# Patient Record
Sex: MALE | Race: WHITE | Employment: STUDENT | URBAN - METROPOLITAN AREA
[De-identification: names, ages, dates, MRNs, and addresses within clinical notes are randomized per-mention and may not be internally consistent; named-entity substitution may affect disease eponyms.]

---

## 2017-12-21 ENCOUNTER — LAB ENCOUNTER (OUTPATIENT)
Dept: LAB | Age: 22
End: 2017-12-21
Attending: FAMILY MEDICINE
Payer: COMMERCIAL

## 2017-12-21 ENCOUNTER — OFFICE VISIT (OUTPATIENT)
Dept: FAMILY MEDICINE CLINIC | Facility: CLINIC | Age: 22
End: 2017-12-21

## 2017-12-21 VITALS
TEMPERATURE: 98 F | HEIGHT: 72 IN | HEART RATE: 115 BPM | DIASTOLIC BLOOD PRESSURE: 70 MMHG | BODY MASS INDEX: 26.89 KG/M2 | WEIGHT: 198.5 LBS | RESPIRATION RATE: 16 BRPM | SYSTOLIC BLOOD PRESSURE: 130 MMHG

## 2017-12-21 DIAGNOSIS — Z23 NEED FOR VACCINATION: ICD-10-CM

## 2017-12-21 DIAGNOSIS — Z00.00 ADULT GENERAL MEDICAL EXAM: ICD-10-CM

## 2017-12-21 DIAGNOSIS — Z00.00 ADULT GENERAL MEDICAL EXAM: Primary | ICD-10-CM

## 2017-12-21 PROCEDURE — 80061 LIPID PANEL: CPT

## 2017-12-21 PROCEDURE — 36415 COLL VENOUS BLD VENIPUNCTURE: CPT

## 2017-12-21 PROCEDURE — 85025 COMPLETE CBC W/AUTO DIFF WBC: CPT

## 2017-12-21 PROCEDURE — 99385 PREV VISIT NEW AGE 18-39: CPT | Performed by: FAMILY MEDICINE

## 2017-12-21 PROCEDURE — 80053 COMPREHEN METABOLIC PANEL: CPT

## 2017-12-21 PROCEDURE — 90686 IIV4 VACC NO PRSV 0.5 ML IM: CPT | Performed by: FAMILY MEDICINE

## 2017-12-21 PROCEDURE — 90471 IMMUNIZATION ADMIN: CPT | Performed by: FAMILY MEDICINE

## 2017-12-21 NOTE — PROGRESS NOTES
Patient ID: Anthony Abdi is a 20year old male. 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   He would not be going to graduate school at  status: Never Smoker    Smokeless tobacco: Never Used    Alcohol use No    Drug use: No    Sexual activity: Not on file     Other Topics Concern   None on file     Social History Narrative   None on file            No current outpatient prescriptions on fi Future  -     LIPID PANEL; Future    Need for vaccination  -     IMMUNIZATION ADMINISTRATION  -     FLULAVAL INFLUENZA VACCINE QUAD PRESERVATIVE FREE 0.5 ML        Follow up if symptoms persist.  Take medicine (if given) as prescribed.   Approach to treatme

## 2018-12-28 ENCOUNTER — LAB ENCOUNTER (OUTPATIENT)
Dept: LAB | Age: 23
End: 2018-12-28
Attending: FAMILY MEDICINE
Payer: COMMERCIAL

## 2018-12-28 ENCOUNTER — OFFICE VISIT (OUTPATIENT)
Dept: FAMILY MEDICINE CLINIC | Facility: CLINIC | Age: 23
End: 2018-12-28
Payer: COMMERCIAL

## 2018-12-28 VITALS
WEIGHT: 205 LBS | HEIGHT: 72 IN | DIASTOLIC BLOOD PRESSURE: 83 MMHG | SYSTOLIC BLOOD PRESSURE: 138 MMHG | TEMPERATURE: 99 F | HEART RATE: 95 BPM | BODY MASS INDEX: 27.77 KG/M2

## 2018-12-28 DIAGNOSIS — R09.81 NASAL CONGESTION: ICD-10-CM

## 2018-12-28 DIAGNOSIS — Z00.00 ADULT GENERAL MEDICAL EXAM: Primary | ICD-10-CM

## 2018-12-28 DIAGNOSIS — Z23 NEED FOR VACCINATION: ICD-10-CM

## 2018-12-28 DIAGNOSIS — Z00.00 ADULT GENERAL MEDICAL EXAM: ICD-10-CM

## 2018-12-28 PROCEDURE — 80053 COMPREHEN METABOLIC PANEL: CPT

## 2018-12-28 PROCEDURE — 85025 COMPLETE CBC W/AUTO DIFF WBC: CPT

## 2018-12-28 PROCEDURE — 99395 PREV VISIT EST AGE 18-39: CPT | Performed by: FAMILY MEDICINE

## 2018-12-28 PROCEDURE — 90686 IIV4 VACC NO PRSV 0.5 ML IM: CPT | Performed by: FAMILY MEDICINE

## 2018-12-28 PROCEDURE — 80061 LIPID PANEL: CPT

## 2018-12-28 PROCEDURE — 90472 IMMUNIZATION ADMIN EACH ADD: CPT | Performed by: FAMILY MEDICINE

## 2018-12-28 PROCEDURE — 90715 TDAP VACCINE 7 YRS/> IM: CPT | Performed by: FAMILY MEDICINE

## 2018-12-28 PROCEDURE — 36415 COLL VENOUS BLD VENIPUNCTURE: CPT

## 2018-12-28 PROCEDURE — 84443 ASSAY THYROID STIM HORMONE: CPT

## 2018-12-28 PROCEDURE — 90471 IMMUNIZATION ADMIN: CPT | Performed by: FAMILY MEDICINE

## 2018-12-28 NOTE — PROGRESS NOTES
Patient ID: Humberto Connell is a 24year old male. 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     He is now in graduate school at [employer].       For 2 12/21/2017     12/21/2017    CO2 28 12/21/2017    OSMOCALC 288 12/21/2017       No results found for: Leni Lamar, [address], [address], [address], [address], [address], [address] [facility], [address] [facility], [facility] [address], [address], [address], [address] [address], [address] [place] [address],[address], [address], [address], RBCUR, EPIU boys      Review of Systems   Constitutional: Negative for fatigue, fever and unexpected weight change. HENT: Negative for facial swelling. Respiratory: Negative for chest tightness. Cardiovascular: Negative for chest pain and palpitations.    Ardell Rides appears well-developed and well-nourished. No distress. HENT:   Head: Normocephalic.    Right Ear: Tympanic membrane and ear canal normal.   Left Ear: Tympanic membrane and ear canal normal.   Mouth/Throat: Oropharynx is clear and moist and mucous membran any significant nasal septal deviation. I will have him see the ENT doctor. Referrals (if applicable)  Orders Placed This Encounter      ENT- DR Garcia Blazing          Order Comments:               For 2 years now he states at nighttime he feels like he sheila

## 2018-12-30 DIAGNOSIS — D64.9 ANEMIA, UNSPECIFIED TYPE: Primary | ICD-10-CM

## 2019-01-08 ENCOUNTER — OFFICE VISIT (OUTPATIENT)
Dept: OTOLARYNGOLOGY | Facility: CLINIC | Age: 24
End: 2019-01-08
Payer: COMMERCIAL

## 2019-01-08 VITALS
HEIGHT: 72 IN | WEIGHT: 200 LBS | TEMPERATURE: 98 F | BODY MASS INDEX: 27.09 KG/M2 | SYSTOLIC BLOOD PRESSURE: 143 MMHG | DIASTOLIC BLOOD PRESSURE: 82 MMHG

## 2019-01-08 DIAGNOSIS — J34.2 DEVIATED SEPTUM: Primary | ICD-10-CM

## 2019-01-08 PROCEDURE — 99243 OFF/OP CNSLTJ NEW/EST LOW 30: CPT | Performed by: OTOLARYNGOLOGY

## 2019-01-08 PROCEDURE — 99212 OFFICE O/P EST SF 10 MIN: CPT | Performed by: OTOLARYNGOLOGY

## 2019-01-08 RX ORDER — FLUTICASONE PROPIONATE 50 MCG
2 SPRAY, SUSPENSION (ML) NASAL DAILY
Qty: 1 BOTTLE | Refills: 3 | Status: SHIPPED | OUTPATIENT
Start: 2019-01-08 | End: 2019-12-26

## 2019-01-08 NOTE — PROGRESS NOTES
Ashutosh Sinclair is a 24year old male.  Patient presents with:  Nose Problem: pt reports trouble breathing out of nose, getting bad during the night time    HPI:   He has had difficulty breathing through his nose for at least 2-3 years but seems to be gettin Submental. Submandibular. Anterior cervical. Posterior cervical. Supraclavicular.    Eyes Normal Conjunctiva - Right: Normal, Left: Normal. Pupil - Right: Normal, Left: Normal.    Ears Normal Inspection - Right: Normal, Left: Normal. Canal - Left: Normal. T

## 2019-01-09 DIAGNOSIS — R31.1 BENIGN MICROSCOPIC HEMATURIA: Primary | ICD-10-CM

## 2019-01-18 ENCOUNTER — LAB ENCOUNTER (OUTPATIENT)
Dept: LAB | Facility: HOSPITAL | Age: 24
End: 2019-01-18
Attending: FAMILY MEDICINE
Payer: COMMERCIAL

## 2019-01-18 DIAGNOSIS — D64.9 ANEMIA, UNSPECIFIED TYPE: ICD-10-CM

## 2019-01-18 DIAGNOSIS — R31.1 BENIGN MICROSCOPIC HEMATURIA: ICD-10-CM

## 2019-01-18 LAB
BACTERIA UR QL AUTO: NEGATIVE /HPF
BASOPHILS # BLD: 0 K/UL (ref 0–0.2)
BASOPHILS NFR BLD: 0 %
BILIRUB UR QL: NEGATIVE
COLOR UR: YELLOW
EOSINOPHIL # BLD: 0.1 K/UL (ref 0–0.7)
EOSINOPHIL NFR BLD: 2 %
ERYTHROCYTE [DISTWIDTH] IN BLOOD BY AUTOMATED COUNT: 14.2 % (ref 11–15)
GLUCOSE UR-MCNC: NEGATIVE MG/DL
HCT VFR BLD AUTO: 40.3 % (ref 41–52)
HGB BLD-MCNC: 13.8 G/DL (ref 13.5–17.5)
KETONES UR-MCNC: NEGATIVE MG/DL
LEUKOCYTE ESTERASE UR QL STRIP.AUTO: NEGATIVE
LYMPHOCYTES # BLD: 2.9 K/UL (ref 1–4)
LYMPHOCYTES NFR BLD: 35 %
MCH RBC QN AUTO: 29 PG (ref 27–32)
MCHC RBC AUTO-ENTMCNC: 34.2 G/DL (ref 32–37)
MCV RBC AUTO: 84.9 FL (ref 80–100)
MONOCYTES # BLD: 0.7 K/UL (ref 0–1)
MONOCYTES NFR BLD: 9 %
NEUTROPHILS # BLD AUTO: 4.5 K/UL (ref 1.8–7.7)
NEUTROPHILS NFR BLD: 54 %
NITRITE UR QL STRIP.AUTO: NEGATIVE
PH UR: 7 [PH] (ref 5–8)
PLATELET # BLD AUTO: 335 K/UL (ref 140–400)
PMV BLD AUTO: 6.9 FL (ref 7.4–10.3)
PROT UR-MCNC: 30 MG/DL
RBC # BLD AUTO: 4.75 M/UL (ref 4.5–5.9)
RBC #/AREA URNS AUTO: 34 /HPF
SP GR UR STRIP: 1.02 (ref 1–1.03)
UROBILINOGEN UR STRIP-ACNC: <2
VIT C UR-MCNC: NEGATIVE MG/DL
WBC # BLD AUTO: 8.3 K/UL (ref 4–11)
WBC #/AREA URNS AUTO: 1 /HPF

## 2019-01-18 PROCEDURE — 36415 COLL VENOUS BLD VENIPUNCTURE: CPT

## 2019-01-18 PROCEDURE — 81001 URINALYSIS AUTO W/SCOPE: CPT

## 2019-01-18 PROCEDURE — 85025 COMPLETE CBC W/AUTO DIFF WBC: CPT

## 2019-01-19 DIAGNOSIS — R31.1 BENIGN ESSENTIAL MICROSCOPIC HEMATURIA: ICD-10-CM

## 2019-01-19 DIAGNOSIS — R31.29 MICROSCOPIC HEMATURIA: Primary | ICD-10-CM

## 2019-01-19 DIAGNOSIS — D64.9 ANEMIA, UNSPECIFIED TYPE: ICD-10-CM

## 2019-02-08 ENCOUNTER — HOSPITAL ENCOUNTER (OUTPATIENT)
Dept: ULTRASOUND IMAGING | Facility: HOSPITAL | Age: 24
Discharge: HOME | End: 2019-02-08
Attending: FAMILY MEDICINE
Payer: COMMERCIAL

## 2019-02-08 DIAGNOSIS — D64.9 ANEMIA, UNSPECIFIED TYPE: ICD-10-CM

## 2019-02-08 DIAGNOSIS — R31.29 MICROSCOPIC HEMATURIA: ICD-10-CM

## 2019-02-08 DIAGNOSIS — R31.1 BENIGN ESSENTIAL MICROSCOPIC HEMATURIA: ICD-10-CM

## 2019-02-08 PROCEDURE — 76775 US EXAM ABDO BACK WALL LIM: CPT | Performed by: FAMILY MEDICINE

## 2019-12-26 ENCOUNTER — OFFICE VISIT (OUTPATIENT)
Dept: FAMILY MEDICINE CLINIC | Facility: CLINIC | Age: 24
End: 2019-12-26
Payer: COMMERCIAL

## 2019-12-26 ENCOUNTER — LAB ENCOUNTER (OUTPATIENT)
Dept: LAB | Age: 24
End: 2019-12-26
Attending: FAMILY MEDICINE
Payer: COMMERCIAL

## 2019-12-26 VITALS
SYSTOLIC BLOOD PRESSURE: 143 MMHG | TEMPERATURE: 97 F | HEART RATE: 98 BPM | DIASTOLIC BLOOD PRESSURE: 62 MMHG | BODY MASS INDEX: 26.38 KG/M2 | HEIGHT: 72 IN | WEIGHT: 194.81 LBS

## 2019-12-26 DIAGNOSIS — Z23 NEED FOR VACCINATION: ICD-10-CM

## 2019-12-26 DIAGNOSIS — Z00.00 ADULT GENERAL MEDICAL EXAM: Primary | ICD-10-CM

## 2019-12-26 DIAGNOSIS — R31.1 BENIGN MICROSCOPIC HEMATURIA: ICD-10-CM

## 2019-12-26 DIAGNOSIS — Z00.00 ADULT GENERAL MEDICAL EXAM: ICD-10-CM

## 2019-12-26 PROCEDURE — 83540 ASSAY OF IRON: CPT | Performed by: FAMILY MEDICINE

## 2019-12-26 PROCEDURE — 85025 COMPLETE CBC W/AUTO DIFF WBC: CPT

## 2019-12-26 PROCEDURE — 90686 IIV4 VACC NO PRSV 0.5 ML IM: CPT | Performed by: FAMILY MEDICINE

## 2019-12-26 PROCEDURE — 99395 PREV VISIT EST AGE 18-39: CPT | Performed by: FAMILY MEDICINE

## 2019-12-26 PROCEDURE — 82728 ASSAY OF FERRITIN: CPT | Performed by: FAMILY MEDICINE

## 2019-12-26 PROCEDURE — 84466 ASSAY OF TRANSFERRIN: CPT | Performed by: FAMILY MEDICINE

## 2019-12-26 PROCEDURE — 80061 LIPID PANEL: CPT

## 2019-12-26 PROCEDURE — 90471 IMMUNIZATION ADMIN: CPT | Performed by: FAMILY MEDICINE

## 2019-12-26 PROCEDURE — 36415 COLL VENOUS BLD VENIPUNCTURE: CPT

## 2019-12-26 PROCEDURE — 84443 ASSAY THYROID STIM HORMONE: CPT

## 2019-12-26 PROCEDURE — 80053 COMPREHEN METABOLIC PANEL: CPT

## 2019-12-26 NOTE — PROGRESS NOTES
Patient ID: Heath Villegas is a 21year old male. HPI  Patient presents with:  Physical    Pt is single and does not smoke. He is currently in graduate school at [employer]. Recently saw Dr. Leocadia Mcburney for ENT. Saw Dr. Rebecca Lundborg for urology on 3/6/19. 12/28/2018    ALT 35 12/28/2018    BILT 0.7 12/28/2018    TP 7.4 12/28/2018    ALB 4.1 12/28/2018    GLOBULIN 3.3 12/28/2018     12/28/2018    K 3.8 12/28/2018     12/28/2018    CO2 26 12/28/2018       Lab Results   Component Value Date    GLU Negative for chest pain. Gastrointestinal: Negative for abdominal pain and blood in stool. Genitourinary: Negative for hematuria. Skin: Negative for color change. Allergic/Immunologic: Negative for environmental allergies.    Neurological: Negative file         No current outpatient medications on file. Allergies:  Amoxicillin                 Comment:Other reaction(s): Rash   PHYSICAL EXAM:   Physical Exam  Physical Exam   Constitutional: He appears well-developed and well-nourished. No distress. already been seen by urology and just needs to do another ultrasound of his bladder in 3 years.   Need for vaccination  -     IMMUNIZATION ADMINISTRATION  -     FLULAVAL INFLUENZA VACCINE QUAD PRESERVATIVE FREE 0.5 ML    Benign microscopic hematuria

## 2019-12-26 NOTE — PATIENT INSTRUCTIONS
BLOOD PRESSURE CUFF    Check your blood pressures on your left arm 2-3 times per week at home. Do this with your left arm supported by a table, do not have it hanging down at your side.   Make sure you do this before y

## 2020-08-16 ENCOUNTER — OFFICE VISIT (OUTPATIENT)
Dept: FAMILY MEDICINE CLINIC | Facility: CLINIC | Age: 25
End: 2020-08-16
Payer: COMMERCIAL

## 2020-08-16 VITALS — OXYGEN SATURATION: 99 % | RESPIRATION RATE: 18 BRPM | HEART RATE: 70 BPM | TEMPERATURE: 98 F

## 2020-08-16 DIAGNOSIS — H69.82 ETD (EUSTACHIAN TUBE DYSFUNCTION), LEFT: Primary | ICD-10-CM

## 2020-08-16 PROCEDURE — 99202 OFFICE O/P NEW SF 15 MIN: CPT
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
## 2020-08-16 NOTE — PATIENT INSTRUCTIONS
Anatomy of the Ear     The ear is a complex and delicate organ. It collects sound waves so you can hear the world around you. The ear also has a second function—it helps you keep your balance. Your ear can be divided into 3 main parts.  These are the out

## 2021-12-07 ENCOUNTER — IMMUNIZATION (OUTPATIENT)
Dept: LAB | Facility: HOSPITAL | Age: 26
End: 2021-12-07
Attending: EMERGENCY MEDICINE
Payer: COMMERCIAL

## 2021-12-07 DIAGNOSIS — Z23 NEED FOR VACCINATION: Primary | ICD-10-CM

## 2021-12-07 PROCEDURE — 0004A SARSCOV2 VAC 30MCG/0.3ML IM: CPT

## 2021-12-20 ENCOUNTER — NURSE ONLY (OUTPATIENT)
Dept: LAB | Facility: HOSPITAL | Age: 26
End: 2021-12-20
Attending: FAMILY MEDICINE
Payer: COMMERCIAL

## 2021-12-20 DIAGNOSIS — Z20.822 CLOSE EXPOSURE TO COVID-19 VIRUS: ICD-10-CM

## 2022-11-20 ENCOUNTER — IMMUNIZATION (OUTPATIENT)
Dept: LAB | Age: 27
End: 2022-11-20
Attending: EMERGENCY MEDICINE
Payer: COMMERCIAL

## 2022-11-20 DIAGNOSIS — Z23 NEED FOR VACCINATION: Primary | ICD-10-CM

## 2022-11-20 PROCEDURE — 90686 IIV4 VACC NO PRSV 0.5 ML IM: CPT

## 2022-11-20 PROCEDURE — 90471 IMMUNIZATION ADMIN: CPT

## 2023-01-06 ENCOUNTER — OFFICE VISIT (OUTPATIENT)
Dept: FAMILY MEDICINE CLINIC | Facility: CLINIC | Age: 28
End: 2023-01-06
Payer: COMMERCIAL

## 2023-01-06 ENCOUNTER — LAB ENCOUNTER (OUTPATIENT)
Dept: LAB | Age: 28
End: 2023-01-06
Attending: FAMILY MEDICINE
Payer: COMMERCIAL

## 2023-01-06 VITALS
BODY MASS INDEX: 28.71 KG/M2 | HEIGHT: 72 IN | TEMPERATURE: 98 F | SYSTOLIC BLOOD PRESSURE: 160 MMHG | HEART RATE: 128 BPM | DIASTOLIC BLOOD PRESSURE: 82 MMHG | WEIGHT: 212 LBS

## 2023-01-06 DIAGNOSIS — R03.0 WHITE COAT SYNDROME WITH HIGH BLOOD PRESSURE WITHOUT HYPERTENSION: ICD-10-CM

## 2023-01-06 DIAGNOSIS — Z00.00 ADULT GENERAL MEDICAL EXAM: Primary | ICD-10-CM

## 2023-01-06 DIAGNOSIS — Z00.00 ADULT GENERAL MEDICAL EXAM: ICD-10-CM

## 2023-01-06 LAB
ALBUMIN SERPL-MCNC: 3.9 G/DL (ref 3.4–5)
ALBUMIN/GLOB SERPL: 1 {RATIO} (ref 1–2)
ALP LIVER SERPL-CCNC: 84 U/L
ALT SERPL-CCNC: 52 U/L
ANION GAP SERPL CALC-SCNC: 6 MMOL/L (ref 0–18)
AST SERPL-CCNC: 24 U/L (ref 15–37)
BASOPHILS # BLD AUTO: 0.04 X10(3) UL (ref 0–0.2)
BASOPHILS NFR BLD AUTO: 0.4 %
BILIRUB SERPL-MCNC: 0.6 MG/DL (ref 0.1–2)
BUN BLD-MCNC: 17 MG/DL (ref 7–18)
BUN/CREAT SERPL: 16.8 (ref 10–20)
CALCIUM BLD-MCNC: 9.4 MG/DL (ref 8.5–10.1)
CHLORIDE SERPL-SCNC: 107 MMOL/L (ref 98–112)
CHOLEST SERPL-MCNC: 193 MG/DL (ref ?–200)
CO2 SERPL-SCNC: 27 MMOL/L (ref 21–32)
CREAT BLD-MCNC: 1.01 MG/DL
DEPRECATED RDW RBC AUTO: 40.6 FL (ref 35.1–46.3)
EOSINOPHIL # BLD AUTO: 0.24 X10(3) UL (ref 0–0.7)
EOSINOPHIL NFR BLD AUTO: 2.5 %
ERYTHROCYTE [DISTWIDTH] IN BLOOD BY AUTOMATED COUNT: 13.1 % (ref 11–15)
FASTING PATIENT LIPID ANSWER: YES
FASTING STATUS PATIENT QL REPORTED: YES
GFR SERPLBLD BASED ON 1.73 SQ M-ARVRAT: 104 ML/MIN/1.73M2 (ref 60–?)
GLOBULIN PLAS-MCNC: 4 G/DL (ref 2.8–4.4)
GLUCOSE BLD-MCNC: 104 MG/DL (ref 70–99)
HCT VFR BLD AUTO: 38.8 %
HDLC SERPL-MCNC: 59 MG/DL (ref 40–59)
HGB BLD-MCNC: 13.2 G/DL
IMM GRANULOCYTES # BLD AUTO: 0.03 X10(3) UL (ref 0–1)
IMM GRANULOCYTES NFR BLD: 0.3 %
LDLC SERPL CALC-MCNC: 121 MG/DL (ref ?–100)
LYMPHOCYTES # BLD AUTO: 2.18 X10(3) UL (ref 1–4)
LYMPHOCYTES NFR BLD AUTO: 22.7 %
MCH RBC QN AUTO: 29.1 PG (ref 26–34)
MCHC RBC AUTO-ENTMCNC: 34 G/DL (ref 31–37)
MCV RBC AUTO: 85.7 FL
MONOCYTES # BLD AUTO: 0.95 X10(3) UL (ref 0.1–1)
MONOCYTES NFR BLD AUTO: 9.9 %
NEUTROPHILS # BLD AUTO: 6.16 X10 (3) UL (ref 1.5–7.7)
NEUTROPHILS # BLD AUTO: 6.16 X10(3) UL (ref 1.5–7.7)
NEUTROPHILS NFR BLD AUTO: 64.2 %
NONHDLC SERPL-MCNC: 134 MG/DL (ref ?–130)
OSMOLALITY SERPL CALC.SUM OF ELEC: 292 MOSM/KG (ref 275–295)
PLATELET # BLD AUTO: 350 10(3)UL (ref 150–450)
POTASSIUM SERPL-SCNC: 3.7 MMOL/L (ref 3.5–5.1)
PROT SERPL-MCNC: 7.9 G/DL (ref 6.4–8.2)
RBC # BLD AUTO: 4.53 X10(6)UL
SODIUM SERPL-SCNC: 140 MMOL/L (ref 136–145)
TRIGL SERPL-MCNC: 73 MG/DL (ref 30–149)
TSI SER-ACNC: 1.34 MIU/ML (ref 0.36–3.74)
VLDLC SERPL CALC-MCNC: 13 MG/DL (ref 0–30)
WBC # BLD AUTO: 9.6 X10(3) UL (ref 4–11)

## 2023-01-06 PROCEDURE — 85025 COMPLETE CBC W/AUTO DIFF WBC: CPT

## 2023-01-06 PROCEDURE — 80061 LIPID PANEL: CPT

## 2023-01-06 PROCEDURE — 84443 ASSAY THYROID STIM HORMONE: CPT

## 2023-01-06 PROCEDURE — 99385 PREV VISIT NEW AGE 18-39: CPT | Performed by: FAMILY MEDICINE

## 2023-01-06 PROCEDURE — 80053 COMPREHEN METABOLIC PANEL: CPT

## 2023-01-06 PROCEDURE — 36415 COLL VENOUS BLD VENIPUNCTURE: CPT

## 2023-01-06 NOTE — PATIENT INSTRUCTIONS
BLOOD PRESSURE CUFF    Get a blood pressure cuff that goes on the arm. Do not get the wrist cuff. Check your blood pressures on your left arm 2-3 times per week at home. Do this with your left arm supported by a table, do not have it hanging down at your side. Make sure you do this before you have any coffee or caffeine. Go ahead and write these numbers down a piece of paper for me. After about 10 to 14 days worth of numbers send them to me through [address] A very reputable brand cuff is Omron.

## 2023-11-06 ENCOUNTER — IMMUNIZATION (OUTPATIENT)
Dept: LAB | Age: 28
End: 2023-11-06
Attending: EMERGENCY MEDICINE
Payer: COMMERCIAL

## 2023-11-06 DIAGNOSIS — Z23 NEED FOR VACCINATION: Primary | ICD-10-CM

## 2023-11-06 PROCEDURE — 90686 IIV4 VACC NO PRSV 0.5 ML IM: CPT

## 2023-11-06 PROCEDURE — 90471 IMMUNIZATION ADMIN: CPT

## 2024-03-01 ENCOUNTER — OFFICE VISIT (OUTPATIENT)
Dept: FAMILY MEDICINE CLINIC | Facility: CLINIC | Age: 29
End: 2024-03-01
Payer: COMMERCIAL

## 2024-03-01 ENCOUNTER — LAB ENCOUNTER (OUTPATIENT)
Dept: LAB | Age: 29
End: 2024-03-01
Attending: FAMILY MEDICINE
Payer: COMMERCIAL

## 2024-03-01 ENCOUNTER — EKG ENCOUNTER (OUTPATIENT)
Dept: LAB | Age: 29
End: 2024-03-01
Attending: FAMILY MEDICINE
Payer: COMMERCIAL

## 2024-03-01 VITALS
BODY MASS INDEX: 29.66 KG/M2 | SYSTOLIC BLOOD PRESSURE: 140 MMHG | WEIGHT: 219 LBS | TEMPERATURE: 97 F | HEART RATE: 108 BPM | DIASTOLIC BLOOD PRESSURE: 80 MMHG | HEIGHT: 72 IN

## 2024-03-01 DIAGNOSIS — R31.21 ASYMPTOMATIC MICROSCOPIC HEMATURIA: ICD-10-CM

## 2024-03-01 DIAGNOSIS — D64.9 ANEMIA, UNSPECIFIED TYPE: ICD-10-CM

## 2024-03-01 DIAGNOSIS — Z00.00 ADULT GENERAL MEDICAL EXAM: ICD-10-CM

## 2024-03-01 DIAGNOSIS — Z00.00 ADULT GENERAL MEDICAL EXAM: Primary | ICD-10-CM

## 2024-03-01 DIAGNOSIS — R03.0 WHITE COAT SYNDROME WITH HIGH BLOOD PRESSURE WITHOUT HYPERTENSION: ICD-10-CM

## 2024-03-01 DIAGNOSIS — R00.0 TACHYCARDIA: ICD-10-CM

## 2024-03-01 LAB
ALBUMIN SERPL-MCNC: 4.4 G/DL (ref 3.2–4.8)
ALBUMIN/GLOB SERPL: 1.3 {RATIO} (ref 1–2)
ALP LIVER SERPL-CCNC: 82 U/L
ALT SERPL-CCNC: 32 U/L
ANION GAP SERPL CALC-SCNC: 5 MMOL/L (ref 0–18)
AST SERPL-CCNC: 23 U/L (ref ?–34)
ATRIAL RATE: 73 BPM
BASOPHILS # BLD AUTO: 0.04 X10(3) UL (ref 0–0.2)
BASOPHILS NFR BLD AUTO: 0.4 %
BILIRUB SERPL-MCNC: 0.4 MG/DL (ref 0.3–1.2)
BILIRUB UR QL: NEGATIVE
BUN BLD-MCNC: 15 MG/DL (ref 9–23)
BUN/CREAT SERPL: 15.8 (ref 10–20)
CALCIUM BLD-MCNC: 9.7 MG/DL (ref 8.7–10.4)
CHLORIDE SERPL-SCNC: 109 MMOL/L (ref 98–112)
CHOLEST SERPL-MCNC: 172 MG/DL (ref ?–200)
CLARITY UR: CLEAR
CO2 SERPL-SCNC: 26 MMOL/L (ref 21–32)
CREAT BLD-MCNC: 0.95 MG/DL
DEPRECATED HBV CORE AB SER IA-ACNC: 77.3 NG/ML
DEPRECATED RDW RBC AUTO: 39.2 FL (ref 35.1–46.3)
EGFRCR SERPLBLD CKD-EPI 2021: 111 ML/MIN/1.73M2 (ref 60–?)
EOSINOPHIL # BLD AUTO: 0.14 X10(3) UL (ref 0–0.7)
EOSINOPHIL NFR BLD AUTO: 1.5 %
ERYTHROCYTE [DISTWIDTH] IN BLOOD BY AUTOMATED COUNT: 12.8 % (ref 11–15)
FASTING PATIENT LIPID ANSWER: YES
FASTING STATUS PATIENT QL REPORTED: YES
GLOBULIN PLAS-MCNC: 3.4 G/DL (ref 2.8–4.4)
GLUCOSE BLD-MCNC: 91 MG/DL (ref 70–99)
GLUCOSE UR-MCNC: NORMAL MG/DL
HCT VFR BLD AUTO: 39.2 %
HDLC SERPL-MCNC: 44 MG/DL (ref 40–59)
HGB BLD-MCNC: 13.3 G/DL
HGB RETIC QN AUTO: 32.8 PG (ref 28.2–36.6)
IMM GRANULOCYTES # BLD AUTO: 0.03 X10(3) UL (ref 0–1)
IMM GRANULOCYTES NFR BLD: 0.3 %
IMM RETICS NFR: 0.15 RATIO (ref 0.1–0.3)
IRON SATN MFR SERPL: 22 %
IRON SERPL-MCNC: 79 UG/DL
KETONES UR-MCNC: NEGATIVE MG/DL
LDLC SERPL CALC-MCNC: 106 MG/DL (ref ?–100)
LEUKOCYTE ESTERASE UR QL STRIP.AUTO: NEGATIVE
LYMPHOCYTES # BLD AUTO: 3.09 X10(3) UL (ref 1–4)
LYMPHOCYTES NFR BLD AUTO: 33.8 %
MCH RBC QN AUTO: 28.5 PG (ref 26–34)
MCHC RBC AUTO-ENTMCNC: 33.9 G/DL (ref 31–37)
MCV RBC AUTO: 84.1 FL
MONOCYTES # BLD AUTO: 0.71 X10(3) UL (ref 0.1–1)
MONOCYTES NFR BLD AUTO: 7.8 %
NEUTROPHILS # BLD AUTO: 5.13 X10 (3) UL (ref 1.5–7.7)
NEUTROPHILS # BLD AUTO: 5.13 X10(3) UL (ref 1.5–7.7)
NEUTROPHILS NFR BLD AUTO: 56.2 %
NITRITE UR QL STRIP.AUTO: NEGATIVE
NONHDLC SERPL-MCNC: 128 MG/DL (ref ?–130)
OSMOLALITY SERPL CALC.SUM OF ELEC: 290 MOSM/KG (ref 275–295)
P AXIS: -4 DEGREES
P-R INTERVAL: 156 MS
PH UR: 6 [PH] (ref 5–8)
PLATELET # BLD AUTO: 382 10(3)UL (ref 150–450)
POTASSIUM SERPL-SCNC: 3.9 MMOL/L (ref 3.5–5.1)
PROT SERPL-MCNC: 7.8 G/DL (ref 5.7–8.2)
Q-T INTERVAL: 402 MS
QRS DURATION: 92 MS
QTC CALCULATION (BEZET): 442 MS
R AXIS: 93 DEGREES
RBC # BLD AUTO: 4.66 X10(6)UL
RBC #/AREA URNS AUTO: >10 /HPF
RETICS # AUTO: 64.3 X10(3) UL (ref 22.5–147.5)
RETICS/RBC NFR AUTO: 1.4 %
SODIUM SERPL-SCNC: 140 MMOL/L (ref 136–145)
SP GR UR STRIP: 1.02 (ref 1–1.03)
T AXIS: 29 DEGREES
TIBC SERPL-MCNC: 361 UG/DL (ref 250–425)
TRANSFERRIN SERPL-MCNC: 242 MG/DL (ref 215–365)
TRIGL SERPL-MCNC: 123 MG/DL (ref 30–149)
TSI SER-ACNC: 1.55 MIU/ML (ref 0.55–4.78)
UROBILINOGEN UR STRIP-ACNC: NORMAL
VENTRICULAR RATE: 73 BPM
VLDLC SERPL CALC-MCNC: 21 MG/DL (ref 0–30)
WBC # BLD AUTO: 9.1 X10(3) UL (ref 4–11)

## 2024-03-01 PROCEDURE — 80053 COMPREHEN METABOLIC PANEL: CPT

## 2024-03-01 PROCEDURE — 85025 COMPLETE CBC W/AUTO DIFF WBC: CPT

## 2024-03-01 PROCEDURE — 3008F BODY MASS INDEX DOCD: CPT | Performed by: FAMILY MEDICINE

## 2024-03-01 PROCEDURE — 80061 LIPID PANEL: CPT

## 2024-03-01 PROCEDURE — 93010 ELECTROCARDIOGRAM REPORT: CPT | Performed by: STUDENT IN AN ORGANIZED HEALTH CARE EDUCATION/TRAINING PROGRAM

## 2024-03-01 PROCEDURE — 83540 ASSAY OF IRON: CPT

## 2024-03-01 PROCEDURE — 99395 PREV VISIT EST AGE 18-39: CPT | Performed by: FAMILY MEDICINE

## 2024-03-01 PROCEDURE — 36415 COLL VENOUS BLD VENIPUNCTURE: CPT

## 2024-03-01 PROCEDURE — 84443 ASSAY THYROID STIM HORMONE: CPT

## 2024-03-01 PROCEDURE — 3077F SYST BP >= 140 MM HG: CPT | Performed by: FAMILY MEDICINE

## 2024-03-01 PROCEDURE — 82728 ASSAY OF FERRITIN: CPT

## 2024-03-01 PROCEDURE — 93005 ELECTROCARDIOGRAM TRACING: CPT

## 2024-03-01 PROCEDURE — 84466 ASSAY OF TRANSFERRIN: CPT

## 2024-03-01 PROCEDURE — 3079F DIAST BP 80-89 MM HG: CPT | Performed by: FAMILY MEDICINE

## 2024-03-01 PROCEDURE — 81001 URINALYSIS AUTO W/SCOPE: CPT

## 2024-03-01 PROCEDURE — 85045 AUTOMATED RETICULOCYTE COUNT: CPT

## 2024-03-01 NOTE — PROGRESS NOTES
Patient ID: Flynn Marc is a 28 year old male.    HPI  Chief Complaint   Patient presents with    Routine Physical     Last physical on 01/06/2023.      Pt graduated from [employer] and [employer]. He is a  for a government [employer]. He is single and does not smoke.  He just bought a house.    I reviewed his labs from January 2023; he has minimal anemia, his sugar was 104, and his LDL was 121. He takes an Iron and Vitamin C supplement. Denies black or bloody stools. Denies hx of stomach ulcers. Pt has a hx of benign microscopic hematuria; denies visible hematuria. I reviewed his US kidneys from February 2019 which was normal. Pt saw Dr. Javan Varghese, urologist in 2019; I reviewed the note.  Patient states she has had this minimal anemia for numerous years.  Its never been bothersome.  He has no fatigue.  We did iron tests sometime ago.    He took his BP twice this morning and it was 121/77 and 118/78; his HR was 76. Pt is unsure if he has a family history of hypertension.       Health Maintenance   Topic Date Due    COVID-19 Vaccine (4 - 2023-24 season) 09/01/2023    Annual Depression Screening  01/01/2024    Annual Physical  01/06/2024    DTaP,Tdap,and Td Vaccines (8 - Td or Tdap) 12/28/2028    Influenza Vaccine  Completed    Pneumococcal Vaccine: Birth to 64yrs  Aged Out       =======================================================    Lab Results   Component Value Date    WBC 9.6 01/06/2023    RBC 4.53 01/06/2023    HGB 13.2 01/06/2023    HCT 38.8 (L) 01/06/2023    .0 01/06/2023    MPV 6.9 (L) 01/18/2019    MCV 85.7 01/06/2023    MCH 29.1 01/06/2023    MCHC 34.0 01/06/2023    RDW 13.1 01/06/2023    NEPRELIM 6.16 01/06/2023    NEPERCENT 64.2 01/06/2023    LYPERCENT 22.7 01/06/2023    MOPERCENT 9.9 01/06/2023    EOPERCENT 2.5 01/06/2023    BAPERCENT 0.4 01/06/2023    NE 6.16 01/06/2023    LYMABS 2.18 01/06/2023    MOABSO 0.95 01/06/2023    EOABSO 0.24 01/06/2023    BAABSO 0.04 01/06/2023       Lab  Results   Component Value Date     (H) 01/06/2023    BUN 17 01/06/2023    BUNCREA 16.8 01/06/2023    CREATSERUM 1.01 01/06/2023    ANIONGAP 6 01/06/2023    GFRNAA 98 12/26/2019    GFRAA 113 12/26/2019    CA 9.4 01/06/2023    OSMOCALC 292 01/06/2023    ALKPHO 84 01/06/2023    AST 24 01/06/2023    ALT 52 01/06/2023    BILT 0.6 01/06/2023    TP 7.9 01/06/2023    ALB 3.9 01/06/2023    GLOBULIN 4.0 01/06/2023     01/06/2023    K 3.7 01/06/2023     01/06/2023    CO2 27.0 01/06/2023       Lab Results   Component Value Date     (H) 01/06/2023    BUN 17 01/06/2023    CREATSERUM 1.01 01/06/2023    BUNCREA 16.8 01/06/2023    ANIONGAP 6 01/06/2023    GFRAA 113 12/26/2019    GFRNAA 98 12/26/2019    CA 9.4 01/06/2023     01/06/2023    K 3.7 01/06/2023     01/06/2023    CO2 27.0 01/06/2023    OSMOCALC 292 01/06/2023       Lab Results   Component Value Date    COLORUR Yellow 01/18/2019    CLARITY Cloudy (A) 01/18/2019    SPECGRAVITY 1.021 01/18/2019    GLUUR Negative 01/18/2019    BILUR Negative 01/18/2019    KETUR Negative 01/18/2019    BLOODURINE Moderate (A) 01/18/2019    PHURINE 7.0 01/18/2019    PROUR 30 (A) 01/18/2019    UROBILINOGEN <2.0 01/18/2019    NITRITE Negative 01/18/2019    LEUUR Negative 01/18/2019    WBCUR 1 01/18/2019    RBCUR 34 (H) 01/18/2019    BACUR Negative 01/18/2019       Lab Results   Component Value Date    CHOLEST 193 01/06/2023    TRIG 73 01/06/2023    HDL 59 01/06/2023     (H) 01/06/2023    VLDL 13 01/06/2023    NONHDLC 134 (H) 01/06/2023     TSH (mIU/mL)   Date Value   01/06/2023 1.340       Lab Results   Component Value Date    IRON 59 (L) 12/26/2019       Lab Results   Component Value Date    SAT 15 (L) 12/26/2019       Lab Results   Component Value Date    IRON 59 (L) 12/26/2019    TRANSFERRIN 265 12/26/2019    TIBCP 395 12/26/2019    SAT 15 (L) 12/26/2019       Lab Results   Component Value Date    DANIELLA 71.2 12/26/2019        =======================================================    Wt Readings from Last 6 Encounters:   03/01/24 219 lb   01/06/23 212 lb   12/26/19 194 lb 12.8 oz   01/08/19 200 lb   12/28/18 205 lb   12/21/17 198 lb 8 oz               BMI Readings from Last 6 Encounters:   03/01/24 29.70 kg/m²   01/06/23 28.75 kg/m²   12/26/19 26.42 kg/m²   01/08/19 27.12 kg/m²   12/28/18 27.80 kg/m²   12/21/17 26.92 kg/m²       BP Readings from Last 6 Encounters:   03/01/24 140/80   01/06/23 160/82   12/26/19 143/62   01/08/19 143/82   12/28/18 138/83   12/21/17 130/70         Review of Systems   Respiratory:  Negative for shortness of breath.    Cardiovascular:  Negative for chest pain.   No pallor, diaphoresis, black stools, bloody stools, abdominal pain, acid reflux.      History reviewed. No pertinent past medical history.    History reviewed. No pertinent surgical history.    Social History     Socioeconomic History    Marital status: Single     Spouse name: Not on file    Number of children: Not on file    Years of education: Not on file    Highest education level: Not on file   Occupational History    Not on file   Tobacco Use    Smoking status: Never    Smokeless tobacco: Never   Vaping Use    Vaping Use: Not on file   Substance and Sexual Activity    Alcohol use: Yes     Comment: socially    Drug use: No    Sexual activity: Not on file   Other Topics Concern    Not on file   Social History Narrative    Not on file     Social Determinants of Health     Financial Resource Strain: Not on file   Food Insecurity: Not on file   Transportation Needs: Not on file   Physical Activity: Not on file   Stress: Not on file   Social Connections: Not on file   Housing Stability: Not on file          No current outpatient medications on file.     Allergies:  Allergies   Allergen Reactions    Amoxicillin      Other reaction(s): Rash      PHYSICAL EXAM:   Physical Exam      Physical Exam   Constitutional: He appears well-developed and  well-nourished. No distress.   Head: Normocephalic.   Right Ear: Tympanic membrane and ear canal normal.   Left Ear: Tympanic membrane and ear canal normal.   Nose: No mucosal edema or rhinorrhea.  Mouth/Throat: Oropharynx is clear and moist and mucous membranes are normal.   Eyes: Conjunctivae and EOM are normal. Pupils are equal, round, and reactive to light.   Neck: Normal range of motion. Neck supple. No thyromegaly present.   Cardiovascular: Regular rhythm and no murmur heard. Tachycardic.   Pulmonary/Chest: Effort normal and breath sounds normal. No respiratory distress.   Abdominal: Soft. Bowel sounds are normal. There is no hepatosplenomegaly. There is no tenderness.   Lymphadenopathy: He has no cervical adenopathy.   Neurological: He is alert and oriented to person, place, and time. He has normal reflexes. No cranial nerve deficit.   Skin: Skin is warm and dry. No rash noted.   Psychiatric: He has a normal mood and affect.  Lower legs: No edema of the legs bilaterally. 2+ pedal pulses.     Vitals reviewed.    Vitals:    03/01/24 0859 03/01/24 0910   BP: 143/86 140/80   Pulse: 111 108   Temp: 97.3 °F (36.3 °C)    TempSrc: Temporal    Weight: 219 lb    Height: 6' (1.829 m)        Blood pressure 143/86, pulse 111, temperature 97.3 °F (36.3 °C), temperature source Temporal, height 6' (1.829 m), weight 219 lb.         ASSESSMENT/PLAN:     Diagnoses and all orders for this visit:    Adult general medical exam  -     CBC With Differential With Platelet; Future  -     Comp Metabolic Panel (14); Future  -     Lipid Panel; Future  -     Assay, Thyroid Stim Hormone; Future    Anemia, unspecified type  -     Ferritin; Future  -     Iron And Tibc; Future  -     Reticulocyte Count; Future    Asymptomatic microscopic hematuria  -     Urinalysis with Culture Reflex; Future  Has already seen urology and they state considering that he has no other symptoms nothing else needs to be done.  White coat syndrome with high blood  pressure without hypertension  He took his blood pressure and pulse today.  They were normal.  Tachycardia  -     EKG 12 Lead; Future  He has never had an EKG and I would just like to do a baseline EKG.  He agrees.      Referrals (if applicable)  No orders of the defined types were placed in this encounter.      Follow up if symptoms persist.  Take medicine (if given) as prescribed.  Approach to treatment discussed and patient/family member understands and agrees to plan.     No follow-ups on file.    There are no Patient Instructions on file for this visit.    Arnulfo Rollins    3/1/2024    By signing my name below, IArnulfo,  attest that this documentation has been prepared under the direction and in the presence of Nadir Catalan DO.   Electronically Signed: Arnulfo Rollins, 3/1/2024, 8:58 AM.    I, Nadir Catalan DO,  personally performed the services described in this documentation. All medical record entries made by the scribe were at my direction and in my presence.  I have reviewed the chart and discharge instructions (if applicable) and agree that the record reflects my personal performance and is accurate and complete.  Nadir Catalan DO, 3/1/2024, 9:27 AM

## 2024-03-03 DIAGNOSIS — R31.29 MICROSCOPIC HEMATURIA: Primary | ICD-10-CM

## 2024-04-01 ENCOUNTER — OFFICE VISIT (OUTPATIENT)
Dept: SURGERY | Facility: CLINIC | Age: 29
End: 2024-04-01
Payer: COMMERCIAL

## 2024-04-01 VITALS
HEIGHT: 72 IN | SYSTOLIC BLOOD PRESSURE: 152 MMHG | WEIGHT: 219 LBS | DIASTOLIC BLOOD PRESSURE: 94 MMHG | BODY MASS INDEX: 29.66 KG/M2

## 2024-04-01 DIAGNOSIS — R31.29 MICROSCOPIC HEMATURIA: Primary | ICD-10-CM

## 2024-04-01 DIAGNOSIS — Z87.442 HISTORY OF NEPHROLITHIASIS: ICD-10-CM

## 2024-04-01 PROCEDURE — 99244 OFF/OP CNSLTJ NEW/EST MOD 40: CPT | Performed by: UROLOGY

## 2024-04-01 PROCEDURE — 3008F BODY MASS INDEX DOCD: CPT | Performed by: UROLOGY

## 2024-04-01 PROCEDURE — 3080F DIAST BP >= 90 MM HG: CPT | Performed by: UROLOGY

## 2024-04-01 PROCEDURE — 3077F SYST BP >= 140 MM HG: CPT | Performed by: UROLOGY

## 2024-04-01 NOTE — PROGRESS NOTES
[facility]  Initial Office Consultation    HPI:   Flynn Marc is a 28 year old male here today for consultation at the request of, and a copy of this note will be sent to, Nadir Catalan DO.    1.  Asymptomatic microscopic hematuria  2.  Personal history of nephrolithiasis  Patient with evidence of microscopic hematuria on urinalysis from January 2019 which showed >10 RBCs per hpf.    He had a renal ultrasound on 2/8/2019 which was essentially unremarkable.  Seen by [facility] and no further evaluation was pursued.    He had another urinalysis by his PCP on 3/1/2024 which shows persistent microscopic hematuria with >10 RBCs per hpf.    He denies gross hematuria, dysuria, flank pain, fevers or chills.    Patient reports personal history of nephrolithiasis around 2013 when he passed a kidney stone.  He has family history of nephrolithiasis and his mother, father, uncles and aunt.    No family history of urologic cancers.      PAST MEDICAL HISTORY: None.    PAST SURGICAL HISTORY: None.    SOCIAL HISTORY: Single.  No children.  No cigarette smoking or illicit drug use.  Rare social alcohol.  Works as a .     Family History   Problem Relation Age of Onset    Cancer Maternal Grandmother     Hypertension Maternal Grandfather     Hypertension Paternal Grandfather      Allergies: Amoxicillin      REVIEW OF SYSTEMS:  Pertinent positives and negatives per HPI. A 12-point ROS was performed and is otherwise negative.       EXAM:  BP (!) 152/94 (BP Location: Left arm, Patient Position: Sitting, Cuff Size: large)   Ht 6' (1.829 m)   Wt 219 lb (99.3 kg)   BMI 29.70 kg/m²     Physical Exam  Constitutional:       General: He is not in acute distress.     Appearance: He is well-developed.   HENT:      Head: Normocephalic.   Eyes:      General: No scleral icterus.  Cardiovascular:      Rate and Rhythm: Normal rate.   Pulmonary:      Effort: Pulmonary effort is normal.   Abdominal:       General: There is no distension.      Palpations: Abdomen is soft.      Tenderness: There is no abdominal tenderness. There is no right CVA tenderness or left CVA tenderness.   Genitourinary:     Penis: Circumcised.       Testes: Normal.   Skin:     General: Skin is warm and dry.   Neurological:      Mental Status: He is alert and oriented to person, place, and time.   Psychiatric:         Mood and Affect: Mood normal.         Behavior: Behavior normal.       LABS:  See HPI for details.      IMAGING:  No results found.      IMPRESSION:  28 year old male with asymptomatic microscopic hematuria and personal history of nephrolithiasis not requiring intervention.    I had a lengthy discussion with Flynn Marc regarding the diagnosis and definition of asymptomatic microscopic hematuria.  We went over the relevant anatomy as well as the different possible etiologies and differential diagnoses including benign causes such as infections, stones, recent instrumentation of the genitourinary tract, or benign enlargement of the prostate (in males).  We also discussed more serious potential causes including malignancy or tumors in the upper or lower urinary tracts.    I then discussed the proposed workup for microscopic hematuria.  This includes a CT-Urogram to evaluate the upper urinary tracts, as well as a cystoscopy to evaluate the bladder and urethra.    Cystoscopy procedure discussed including rationale, approach, benefits, risks, and alternatives.    Further management and recommendations will be based on the results of the workup as outlined above. The patient wishes to proceed with the workup as discussed.  They asked appropriate questions all of which were answered to their satisfaction.      PLAN:  1.  CT urogram for evaluation of asymptomatic microscopic hematuria.  Patient with previous history of nephrolithiasis.    2.  Schedule for office cystoscopy to complete evaluation of microscopic hematuria.    Sidney  MD Iliana  4/1/2024

## 2024-04-25 ENCOUNTER — PROCEDURE (OUTPATIENT)
Dept: SURGERY | Facility: CLINIC | Age: 29
End: 2024-04-25
Payer: COMMERCIAL

## 2024-04-25 VITALS — SYSTOLIC BLOOD PRESSURE: 138 MMHG | DIASTOLIC BLOOD PRESSURE: 82 MMHG

## 2024-04-25 DIAGNOSIS — R31.1 BENIGN MICROSCOPIC HEMATURIA: Primary | ICD-10-CM

## 2024-04-25 DIAGNOSIS — Z87.442 HISTORY OF NEPHROLITHIASIS: ICD-10-CM

## 2024-04-25 PROCEDURE — 52000 CYSTOURETHROSCOPY: CPT | Performed by: UROLOGY

## 2024-04-25 PROCEDURE — 3075F SYST BP GE 130 - 139MM HG: CPT | Performed by: UROLOGY

## 2024-04-25 PROCEDURE — 3079F DIAST BP 80-89 MM HG: CPT | Performed by: UROLOGY

## 2024-04-25 RX ORDER — CIPROFLOXACIN 500 MG/1
500 TABLET, FILM COATED ORAL ONCE
Status: COMPLETED | OUTPATIENT
Start: 2024-04-25 | End: 2024-04-25

## 2024-04-25 RX ADMIN — CIPROFLOXACIN 500 MG: 500 TABLET, FILM COATED ORAL at 16:01:00

## 2024-04-25 NOTE — PROGRESS NOTES
[facility]  Follow-Up Visit    HPI: Flynn Marc is a 28 year old male presents for a follow up visit. Patient was last seen on 4/1/2024.    INTERVAL HISTORY: Patient presents for flexible cystourethroscopy for further evaluation of asymptomatic microscopic hematuria.    CT urogram is pending and scheduled for 4/27/2024.    No flank pain, fevers or chills.  No gross hematuria or dysuria.    History of nephrolithiasis in 2013 not requiring intervention.      1.  Asymptomatic microscopic hematuria  2.  Personal history of nephrolithiasis  Patient with evidence of microscopic hematuria on urinalysis from January 2019 which showed >10 RBCs per hpf.     He had a renal ultrasound on 2/8/2019 which was essentially unremarkable.  Seen by [facility] and no further evaluation was pursued.     He had another urinalysis by his PCP on 3/1/2024 which shows persistent microscopic hematuria with >10 RBCs per hpf.     He denies gross hematuria, dysuria, flank pain, fevers or chills.     Patient reports personal history of nephrolithiasis around 2013 when he passed a kidney stone.  He has family history of nephrolithiasis and his mother, father, uncles and aunt.     No family history of urologic cancers.        PAST MEDICAL HISTORY: None.     PAST SURGICAL HISTORY: None.     SOCIAL HISTORY: Single.  No children.  No cigarette smoking or illicit drug use.  Rare social alcohol.  Works as a .     Reviewed past medical, surgical, family, and social history.  Reviewed med list and allergies.      REVIEW OF SYSTEMS:  Pertinent positives and negatives per HPI. A 10-point ROS was performed and is otherwise negative.       EXAM:  There were no vitals taken for this visit.    Physical Exam  Constitutional:       Appearance: He is well-developed.   HENT:      Head: Normocephalic.   Eyes:      General: No scleral icterus.  Cardiovascular:      Rate and Rhythm: Normal rate.   Pulmonary:      Effort:  Pulmonary effort is normal.   Skin:     General: Skin is warm and dry.   Neurological:      Mental Status: He is alert and oriented to person, place, and time.   Psychiatric:         Mood and Affect: Mood normal.         Behavior: Behavior normal.       PATHOLOGY:  No results found.      LABS:  See HPI for details.      IMAGING:  No results found.      UROLOGY PROCEDURE:    CYSTOURETHROSCOPY  Informed consent was obtained for the procedure. The site was prepped and draped in the usual sterile fashion. An Ambu 16 [place] flexible cystoscope was utilized for the procedure.    Anesthesia:  2% lidocaine gel.    Urethra: Normal without strictures, masses, stones, or lesions.    Prostate / Pelvic: Normal without significant enlargement or evidence of bladder outlet obstruction.    Bladder: Normal.  No tumor, stone, diverticulum, or glomerulation.    U.O's: Normal.    Trabeculation: Not appreciated.    POST CYSTOSCOPY MEDICATIONS: sample one tablet Cipro 500 mg given to patient.    DIAGNOSIS: Normal cystourethroscopy.      IMPRESSION:  28 year old male  with asymptomatic microscopic hematuria and personal history of nephrolithiasis not requiring intervention.     CT urogram pending for 4/27/2024.  Patient will be notified of the results.    Cystoscopy performed today for evaluation of his microscopic hematuria was unremarkable.  Findings were reviewed with patient.  All questions answered.      PLAN:  1.  Follow-up on CT urogram scheduled for 4/27/2024.  Patient be notified of the results.    If negative, no further evaluation of microscopic hematuria indicated.  Repeat evaluation would be considered in 3 to 5 years if he has persistent microscopic hematuria.      Sidney Barrientos MD  4/25/2024

## 2024-04-27 ENCOUNTER — HOSPITAL ENCOUNTER (OUTPATIENT)
Dept: CT IMAGING | Age: 29
Discharge: HOME | End: 2024-04-27
Attending: UROLOGY
Payer: COMMERCIAL

## 2024-04-27 DIAGNOSIS — Z87.442 HISTORY OF NEPHROLITHIASIS: ICD-10-CM

## 2024-04-27 DIAGNOSIS — R31.29 MICROSCOPIC HEMATURIA: ICD-10-CM

## 2024-04-27 LAB
CREAT BLD-MCNC: 1 MG/DL
EGFRCR SERPLBLD CKD-EPI 2021: 104 ML/MIN/1.73M2 (ref 60–?)

## 2024-04-27 PROCEDURE — 82565 ASSAY OF CREATININE: CPT

## 2024-04-27 PROCEDURE — 74178 CT ABD&PLV WO CNTR FLWD CNTR: CPT | Performed by: UROLOGY

## 2024-06-23 ENCOUNTER — HOSPITAL ENCOUNTER (OUTPATIENT)
Age: 29
Discharge: HOME | End: 2024-06-23

## 2024-06-23 VITALS
DIASTOLIC BLOOD PRESSURE: 71 MMHG | SYSTOLIC BLOOD PRESSURE: 129 MMHG | TEMPERATURE: 101 F | OXYGEN SATURATION: 97 % | RESPIRATION RATE: 20 BRPM | HEART RATE: 106 BPM

## 2024-06-23 DIAGNOSIS — Z20.822 ENCOUNTER FOR LABORATORY TESTING FOR COVID-19 VIRUS: ICD-10-CM

## 2024-06-23 DIAGNOSIS — U07.1 COVID-19 VIRUS INFECTION: Primary | ICD-10-CM

## 2024-06-23 LAB
POCT INFLUENZA A: NEGATIVE
POCT INFLUENZA B: NEGATIVE
SARS-COV-2 RNA RESP QL NAA+PROBE: DETECTED

## 2024-06-23 RX ORDER — IBUPROFEN 600 MG/1
TABLET ORAL
Qty: 20 TABLET | Refills: 0 | Status: SHIPPED | OUTPATIENT
Start: 2024-06-23

## 2024-06-23 RX ORDER — ACETAMINOPHEN 325 MG/1
650 TABLET ORAL
Qty: 40 TABLET | Refills: 0 | Status: SHIPPED | OUTPATIENT
Start: 2024-06-23

## 2024-06-23 RX ORDER — IBUPROFEN 600 MG/1
600 TABLET ORAL ONCE
Status: COMPLETED | OUTPATIENT
Start: 2024-06-23 | End: 2024-06-23

## 2024-06-23 NOTE — ED INITIAL ASSESSMENT (HPI)
Pt complaining of sinus pressure, headache and fever since this am. Doesn't feel better with Musinex. Last dose tylenol 11:30am. No known sick contacts.

## 2024-06-23 NOTE — ED PROVIDER NOTES
Patient Seen in: [facility]    History     Chief Complaint   Patient presents with    Fever     Stated Complaint: sinus pressure, fever    HPI    28-year-old male presents with chief complaint of sinus pain.  Onset this morning.  Patient reports associated fever and generalized headache.  Patient denies worst headache of life.  Patient denies acute onset of headache.  Patient denies chills, cough, earache, sore throat, abdominal pain, nausea, vomiting, diarrhea, constipation, dysuria, hematuria, flank pain, rash, neck pain, neck swelling, restricted neck movement, dyspnea, wheeze, hemoptysis, weakness, paresthesias, vision changes, altered mental status, loss of consciousness, amnesia.    No past medical history on file.    No past surgical history on file.         Family History   Problem Relation Age of Onset    Cancer Maternal Grandmother     Hypertension Maternal Grandfather     Hypertension Paternal Grandfather        Social History     Socioeconomic History    Marital status: Single   Tobacco Use    Smoking status: Never    Smokeless tobacco: Never   Substance and Sexual Activity    Alcohol use: Yes     Comment: socially    Drug use: No       Review of Systems    Positive for stated complaint: sinus pressure, fever  Other systems are as noted in HPI.  Constitutional and vital signs reviewed.      All other systems reviewed and negative except as noted above.    PSFH elements reviewed from today and agreed except as otherwise stated in HPI.    Physical Exam     ED Triage Vitals [06/23/24 1402]   /71   Pulse 114   Resp 20   Temp (!) 101.2 °F (38.4 °C)   Temp src Oral   SpO2 97 %   O2 Device None (Room air)       Current:/71   Pulse 114   Temp (!) 101.2 °F (38.4 °C) (Oral)   Resp 20   SpO2 97%      PULSE OX within normal limits on room air as interpreted by this provider.    Constitutional: The patient is cooperative. Appears well-developed and well-nourished.  Mild  discomfort.  Psychological: Alert, No abnormalities of mood, affect.  Head: Normocephalic/atraumatic.  Eyes: Pupils are equal round reactive to light.  Conjunctiva are within normal limits.  ENT: Pharynx noninjected.  Tonsils within normal size limits bilaterally.  No tonsillar exudates.  TMs within normal limits bilaterally.  Mucous membranes moist.  Neck: The neck is supple.  No meningeal signs. Nontender.   Respiratory: Respiratory effort was normal.  There is no stridor.  Air entry is equal.  Cardiovascular: Tachycardic, regular rhythm.  Capillary refill is brisk.    Genitourinary: Not examined.  Lymphatic: No gross lymphadenopathy noted.  Musculoskeletal: Musculoskeletal system is grossly intact.  There is no obvious deformity.  Neurological: No facial asymmetry.  Normal gait.  Normal sensory exam.  Patient exhibits normal speech.  Strength and range of motion symmetrical of all extremities x4.  Skin: Skin is normal to inspection.  Warm and dry.  No obvious bruising.  No obvious rash.    ED Course     Labs Reviewed   RAPID SARS-COV-2 BY PCR - Abnormal; Notable for the following components:       Result Value    Rapid SARS-CoV-2 by PCR Detected (*)     All other components within normal limits   POCT FLU TEST - Normal    Narrative:     This assay is a rapid molecular in vitro test utilizing nucleic acid amplification of influenza A and B viral RNA.       MDM     COVID-19 positive.  Influenza negative    Physical exam remained stable as previously documented.  Available results reviewed with patient.    I have given the patient instructions regarding their diagnoses, expectations, follow up, and ER precautions. I explained to the patient that emergent conditions may arise and to go to the ER for new, worsening or any persistent conditions. I've explained the importance of following up with their doctor as instructed. The patient verbalized understanding of the discharge instructions and plan.    Disposition and  Plan     Clinical Impression:  1. COVID-19 virus infection    2. Encounter for laboratory testing for COVID-19 virus        Disposition:  Discharge    Follow-up:  Nadir Catalan DO  [address] [place] [address]  [phone]    Call in 1 day  For follow-up      Medications Prescribed:  Current Discharge Medication List        START taking these medications    Details   ibuprofen 600 MG Oral Tab Take 1 tablet (600 mg total) by mouth every 6 hours with food  Qty: 20 tablet, Refills: 0      acetaminophen 325 MG Oral Tab Take 2 tablets (650 mg total) by mouth every 4 to 6 hours as needed for Pain or Fever.  Qty: 40 tablet, Refills: 0

## 2025-03-07 ENCOUNTER — LAB ENCOUNTER (OUTPATIENT)
Dept: LAB | Age: 30
End: 2025-03-07
Attending: FAMILY MEDICINE
Payer: COMMERCIAL

## 2025-03-07 ENCOUNTER — OFFICE VISIT (OUTPATIENT)
Dept: FAMILY MEDICINE CLINIC | Facility: CLINIC | Age: 30
End: 2025-03-07
Payer: COMMERCIAL

## 2025-03-07 VITALS
DIASTOLIC BLOOD PRESSURE: 77 MMHG | HEIGHT: 72 IN | WEIGHT: 201.81 LBS | BODY MASS INDEX: 27.33 KG/M2 | SYSTOLIC BLOOD PRESSURE: 124 MMHG | HEART RATE: 96 BPM | TEMPERATURE: 97 F

## 2025-03-07 DIAGNOSIS — Z00.00 ADULT GENERAL MEDICAL EXAM: Primary | ICD-10-CM

## 2025-03-07 DIAGNOSIS — Z00.00 ADULT GENERAL MEDICAL EXAM: ICD-10-CM

## 2025-03-07 LAB
ALBUMIN SERPL-MCNC: 4.8 G/DL (ref 3.2–4.8)
ALBUMIN/GLOB SERPL: 1.8 {RATIO} (ref 1–2)
ALP LIVER SERPL-CCNC: 81 U/L
ALT SERPL-CCNC: 17 U/L
ANION GAP SERPL CALC-SCNC: 9 MMOL/L (ref 0–18)
AST SERPL-CCNC: 18 U/L (ref ?–34)
BASOPHILS # BLD AUTO: 0.04 X10(3) UL (ref 0–0.2)
BASOPHILS NFR BLD AUTO: 0.5 %
BILIRUB SERPL-MCNC: 0.5 MG/DL (ref 0.3–1.2)
BUN BLD-MCNC: 13 MG/DL (ref 9–23)
BUN/CREAT SERPL: 12.9 (ref 10–20)
CALCIUM BLD-MCNC: 9.5 MG/DL (ref 8.7–10.4)
CHLORIDE SERPL-SCNC: 107 MMOL/L (ref 98–112)
CHOLEST SERPL-MCNC: 157 MG/DL (ref ?–200)
CO2 SERPL-SCNC: 27 MMOL/L (ref 21–32)
CREAT BLD-MCNC: 1.01 MG/DL
DEPRECATED RDW RBC AUTO: 43.4 FL (ref 35.1–46.3)
EGFRCR SERPLBLD CKD-EPI 2021: 103 ML/MIN/1.73M2 (ref 60–?)
EOSINOPHIL # BLD AUTO: 0.07 X10(3) UL (ref 0–0.7)
EOSINOPHIL NFR BLD AUTO: 0.9 %
ERYTHROCYTE [DISTWIDTH] IN BLOOD BY AUTOMATED COUNT: 13.2 % (ref 11–15)
FASTING PATIENT LIPID ANSWER: YES
FASTING STATUS PATIENT QL REPORTED: YES
GLOBULIN PLAS-MCNC: 2.6 G/DL (ref 2–3.5)
GLUCOSE BLD-MCNC: 94 MG/DL (ref 70–99)
HCT VFR BLD AUTO: 41.8 %
HDLC SERPL-MCNC: 48 MG/DL (ref 40–59)
HGB BLD-MCNC: 13.6 G/DL
IMM GRANULOCYTES # BLD AUTO: 0.01 X10(3) UL (ref 0–1)
IMM GRANULOCYTES NFR BLD: 0.1 %
LDLC SERPL CALC-MCNC: 95 MG/DL (ref ?–100)
LYMPHOCYTES # BLD AUTO: 2.2 X10(3) UL (ref 1–4)
LYMPHOCYTES NFR BLD AUTO: 29.5 %
MCH RBC QN AUTO: 28.9 PG (ref 26–34)
MCHC RBC AUTO-ENTMCNC: 32.5 G/DL (ref 31–37)
MCV RBC AUTO: 88.7 FL
MONOCYTES # BLD AUTO: 0.66 X10(3) UL (ref 0.1–1)
MONOCYTES NFR BLD AUTO: 8.8 %
NEUTROPHILS # BLD AUTO: 4.48 X10 (3) UL (ref 1.5–7.7)
NEUTROPHILS # BLD AUTO: 4.48 X10(3) UL (ref 1.5–7.7)
NEUTROPHILS NFR BLD AUTO: 60.2 %
NONHDLC SERPL-MCNC: 109 MG/DL (ref ?–130)
OSMOLALITY SERPL CALC.SUM OF ELEC: 296 MOSM/KG (ref 275–295)
PLATELET # BLD AUTO: 385 10(3)UL (ref 150–450)
POTASSIUM SERPL-SCNC: 4.4 MMOL/L (ref 3.5–5.1)
PROT SERPL-MCNC: 7.4 G/DL (ref 5.7–8.2)
RBC # BLD AUTO: 4.71 X10(6)UL
SODIUM SERPL-SCNC: 143 MMOL/L (ref 136–145)
TRIGL SERPL-MCNC: 70 MG/DL (ref 30–149)
TSI SER-ACNC: 0.78 UIU/ML (ref 0.55–4.78)
VLDLC SERPL CALC-MCNC: 11 MG/DL (ref 0–30)
WBC # BLD AUTO: 7.5 X10(3) UL (ref 4–11)

## 2025-03-07 PROCEDURE — 84443 ASSAY THYROID STIM HORMONE: CPT

## 2025-03-07 PROCEDURE — 80061 LIPID PANEL: CPT

## 2025-03-07 PROCEDURE — 36415 COLL VENOUS BLD VENIPUNCTURE: CPT

## 2025-03-07 PROCEDURE — 80053 COMPREHEN METABOLIC PANEL: CPT

## 2025-03-07 PROCEDURE — 99395 PREV VISIT EST AGE 18-39: CPT | Performed by: FAMILY MEDICINE

## 2025-03-07 PROCEDURE — 85025 COMPLETE CBC W/AUTO DIFF WBC: CPT

## 2025-03-07 NOTE — PROGRESS NOTES
Patient ID: Flynn Marc is a 29 year old male.         The following individual(s) verbally consented to be recorded using ambient AI listening technology and understand that they can each withdraw their consent to this listening technology at any point by asking the clinician to turn off or pause the recording:    Patient name: Flynn Marc  Additional names:            HPI  Chief Complaint   Patient presents with    Routine Physical       History of Present Illness  He presents for an annual physical exam.    He has experienced a weight loss of eighteen pounds since April 2024, which he attributes to improved dietary habits and increased physical activity. He has been walking his dog more consistently and has resumed running.    He is not currently taking any medications.    Health Maintenance   Topic Date Due    COVID-19 Vaccine (4 - 2024-25 season) 09/01/2024    Influenza Vaccine (1) 10/01/2024    Annual Depression Screening  01/01/2025    Annual Physical  03/01/2025    DTaP,Tdap,and Td Vaccines (8 - Td or Tdap) 12/28/2028    Pneumococcal Vaccine: Birth to 50yrs  Aged Out    Meningococcal B Vaccine  Aged Out       Results      =======================================================    Lab Results   Component Value Date    WBC 9.1 03/01/2024    RBC 4.66 03/01/2024    HGB 13.3 03/01/2024    HCT 39.2 03/01/2024    .0 03/01/2024    MPV 6.9 (L) 01/18/2019    MCV 84.1 03/01/2024    MCH 28.5 03/01/2024    MCHC 33.9 03/01/2024    RDW 12.8 03/01/2024    NEPRELIM 5.13 03/01/2024    NEPERCENT 56.2 03/01/2024    LYPERCENT 33.8 03/01/2024    MOPERCENT 7.8 03/01/2024    EOPERCENT 1.5 03/01/2024    BAPERCENT 0.4 03/01/2024    NE 5.13 03/01/2024    LYMABS 3.09 03/01/2024    MOABSO 0.71 03/01/2024    EOABSO 0.14 03/01/2024    BAABSO 0.04 03/01/2024       Lab Results   Component Value Date    GLU 91 03/01/2024    BUN 15 03/01/2024    BUNCREA 15.8 03/01/2024    CREATSERUM 0.95 03/01/2024    ANIONGAP 5 03/01/2024     GFRNAA 98 12/26/2019    GFRAA 113 12/26/2019    CA 9.7 03/01/2024    OSMOCALC 290 03/01/2024    ALKPHO 82 03/01/2024    AST 23 03/01/2024    ALT 32 03/01/2024    BILT 0.4 03/01/2024    TP 7.8 03/01/2024    ALB 4.4 03/01/2024    GLOBULIN 3.4 03/01/2024     03/01/2024    K 3.9 03/01/2024     03/01/2024    CO2 26.0 03/01/2024       Lab Results   Component Value Date    GLU 91 03/01/2024    BUN 15 03/01/2024    CREATSERUM 0.95 03/01/2024    BUNCREA 15.8 03/01/2024    ANIONGAP 5 03/01/2024    GFRAA 113 12/26/2019    GFRNAA 98 12/26/2019    CA 9.7 03/01/2024     03/01/2024    K 3.9 03/01/2024     03/01/2024    CO2 26.0 03/01/2024    OSMOCALC 290 03/01/2024       Lab Results   Component Value Date    COLORUR Light-Yellow 03/01/2024    CLARITY Clear 03/01/2024    SPECGRAVITY 1.023 03/01/2024    GLUUR Normal 03/01/2024    BILUR Negative 03/01/2024    KETUR Negative 03/01/2024    BLOODURINE 3+ (A) 03/01/2024    PHURINE 6.0 03/01/2024    PROUR Trace (A) 03/01/2024    UROBILINOGEN Normal 03/01/2024    NITRITE Negative 03/01/2024    LEUUR Negative 03/01/2024    WBCUR 1-5 03/01/2024    RBCUR >10 (A) 03/01/2024    EPIUR None Seen 03/01/2024    BACUR None Seen 03/01/2024     No results found for: \"EAG\", \"A1C\"    No results found for: \"MALBP\", \"CREUR\", \"CREAURINE\", \"MIALBURINE\", \"MCRRATIOUR\", \"MALBCRECALC\", \"MICROALBUMIN\", \"CREAUR\", \"MALBCREACALC\"    Lab Results   Component Value Date    CHOLEST 172 03/01/2024    TRIG 123 03/01/2024    HDL 44 03/01/2024     (H) 03/01/2024    VLDL 21 03/01/2024    NONHDLC 128 03/01/2024     TSH (mIU/mL)   Date Value   03/01/2024 1.553       No results found for: \"B12\", \"VITB12\"    Lab Results   Component Value Date    IRON 79 03/01/2024       Lab Results   Component Value Date    SAT 22 03/01/2024       Lab Results   Component Value Date    IRON 79 03/01/2024    TRANSFERRIN 242 03/01/2024    TIBCP 361 03/01/2024    SAT 22 03/01/2024       Lab Results   Component Value  Date    DANIELLA 77.3 03/01/2024       No results found for: \"VITD\", \"QVITD\", \"CMNA55BA\"  No results found for: \"PSA\", \"QPSA\", \"TOTPSASCREEN\"    =======================================================    Wt Readings from Last 6 Encounters:   03/07/25 201 lb 12.8 oz (91.5 kg)   04/01/24 219 lb (99.3 kg)   03/01/24 219 lb (99.3 kg)   01/06/23 212 lb (96.2 kg)   12/26/19 194 lb 12.8 oz (88.4 kg)   01/08/19 200 lb (90.7 kg)               BMI Readings from Last 6 Encounters:   03/07/25 27.37 kg/m²   04/01/24 29.70 kg/m²   03/01/24 29.70 kg/m²   01/06/23 28.75 kg/m²   12/26/19 26.42 kg/m²   01/08/19 27.12 kg/m²       BP Readings from Last 6 Encounters:   03/07/25 124/77   06/23/24 129/71   04/25/24 138/82   04/01/24 (!) 152/94   03/01/24 140/80   01/06/23 160/82         Review of Systems      No past medical history on file.    No past surgical history on file.    Social History     Socioeconomic History    Marital status: Single     Spouse name: Not on file    Number of children: Not on file    Years of education: Not on file    Highest education level: Not on file   Occupational History    Not on file   Tobacco Use    Smoking status: Never    Smokeless tobacco: Never   Vaping Use    Vaping status: Not on file   Substance and Sexual Activity    Alcohol use: Yes     Comment: socially    Drug use: No    Sexual activity: Not on file   Other Topics Concern    Not on file   Social History Narrative    Not on file     Social Drivers of Health     Food Insecurity: Not on file   Transportation Needs: Not on file   Stress: Not on file   Housing Stability: Not on file          No current outpatient medications on file.     Allergies:Allergies[1]   PHYSICAL EXAM:   Physical Exam  Physical Exam       Skin: Skin is warm and dry. No rash noted.   Psychiatric: He has a normal mood and affect.     Physical Exam  VITALS: P- 96  GENERAL: Alert, cooperative, well developed, no acute distress.  HEENT: Normocephalic, normal oropharynx, moist  mucous membranes. Ears clear, no infection bilaterally. Nasal mucosa normal, no obstruction. No tonsillar swelling or redness.  NECK: No cervical lymphadenopathy. Thyroid smooth, no thyromegaly.  CHEST: Clear to auscultation bilaterally. No wheezes, rhonchi, or crackles.  CARDIOVASCULAR: Normal heart rate and rhythm, S1 and S2 normal without murmurs.  ABDOMEN: Soft, non-tender, non-distended, without organomegaly. Normal bowel sounds. No hepatosplenomegaly.  EXTREMITIES: No cyanosis or edema.  NEUROLOGICAL: Cranial nerves grossly intact, moves all extremities without gross motor or sensory deficit.  Vitals reviewed.    Blood pressure 124/77, pulse 114, temperature 97.3 °F (36.3 °C), temperature source Temporal, height 6' (1.829 m), weight 201 lb 12.8 oz (91.5 kg).         Vitals:    03/07/25 1013 03/07/25 1024   BP: 124/77    Pulse: 114 96   Temp: 97.3 °F (36.3 °C)    TempSrc: Temporal    Weight: 201 lb 12.8 oz (91.5 kg)    Height: 6' (1.829 m)              ASSESSMENT/PLAN:     Diagnoses and all orders for this visit:    Adult general medical exam  -     CBC With Differential With Platelet; Future  -     Comp Metabolic Panel (14); Future  -     Lipid Panel; Future  -     Assay, Thyroid Stim Hormone; Future        Referrals (if applicable)  No orders of the defined types were placed in this encounter.        Follow up if symptoms persist.  Take medicine (if given) as prescribed.  Approach to treatment discussed and patient/family member understands and agrees to plan.     No follow-ups on file.      Assessment & Plan  General Health Maintenance  Routine physical examination for a  with notable weight loss of 18 pounds since April 2024 due to improved diet and increased physical activity. Blood pressure and pulse are within normal limits. No abnormalities detected in the physical examination. Discussed the importance of annual influenza vaccination due to high flu activity this year.  - Order CBC to  check for anemia and leukemia  - Order blood tests for glucose, renal function, liver function, thyroid function, and diabetes  - Recommend annual influenza vaccination in September or October  - Review lab results via [facility] and follow up as needed.  Nadir Catalan DO  3/7/2025      .        [1]   Allergies  Allergen Reactions    Amoxicillin      Other reaction(s): Rash

## (undated) NOTE — LETTER
Austin Johnson Do  [address]   [address]  [address] [facility], [address] [facility]       01/08/19        Patient: Andrews Edward   YOB: 1995   Date of Visit: 1/8/2019       Dear  Dr. Vale Rdz DO,      Thank you for referring Andrews Edward to my practi